# Patient Record
Sex: MALE | Race: OTHER | URBAN - METROPOLITAN AREA
[De-identification: names, ages, dates, MRNs, and addresses within clinical notes are randomized per-mention and may not be internally consistent; named-entity substitution may affect disease eponyms.]

---

## 2022-04-14 ENCOUNTER — PREPPED CHART (OUTPATIENT)
Dept: URBAN - METROPOLITAN AREA CLINIC 50 | Facility: CLINIC | Age: 77
End: 2022-04-14

## 2022-09-23 ENCOUNTER — NEW PATIENT (OUTPATIENT)
Dept: URBAN - METROPOLITAN AREA CLINIC 50 | Facility: CLINIC | Age: 77
End: 2022-09-23

## 2022-09-23 DIAGNOSIS — H40.1131: ICD-10-CM

## 2022-09-23 DIAGNOSIS — H35.3130: ICD-10-CM

## 2022-09-23 DIAGNOSIS — E11.9: ICD-10-CM

## 2022-09-23 DIAGNOSIS — H25.813: ICD-10-CM

## 2022-09-23 PROCEDURE — 76514 ECHO EXAM OF EYE THICKNESS: CPT

## 2022-09-23 PROCEDURE — 92015 DETERMINE REFRACTIVE STATE: CPT

## 2022-09-23 PROCEDURE — 92134 CPTRZ OPH DX IMG PST SGM RTA: CPT

## 2022-09-23 PROCEDURE — 92004 COMPRE OPH EXAM NEW PT 1/>: CPT

## 2022-09-23 RX ORDER — BRIMONIDINE TARTRATE, TIMOLOL MALEATE 2; 5 MG/ML; MG/ML: 1 SOLUTION/ DROPS OPHTHALMIC TWICE A DAY

## 2022-09-23 ASSESSMENT — VISUAL ACUITY
OU_SC: J16
OS_PH: 20/25
OD_CC: 20/40
OD_CC: J1
OD_SC: J16
OS_SC: J16
OS_SC: 20/25
OU_SC: 20/25
OS_CC: J2
OU_CC: J1
OS_CC: 20/30
OU_CC: 20/30
OD_SC: 20/25
OD_PH: 20/25

## 2022-09-23 ASSESSMENT — KERATOMETRY
OD_K1POWER_DIOPTERS: 42.25
OS_K1POWER_DIOPTERS: 41.75
OS_AXISANGLE_DEGREES: 003
OD_AXISANGLE_DEGREES: 175
OS_AXISANGLE2_DEGREES: 93
OS_K2POWER_DIOPTERS: 43.00
OD_AXISANGLE2_DEGREES: 85
OD_K2POWER_DIOPTERS: 42.50

## 2022-09-23 ASSESSMENT — PACHYMETRY
OS_CT_UM: 486
OD_CT_UM: 498

## 2022-09-23 ASSESSMENT — TONOMETRY
OD_IOP_MMHG: 19
OD_IOP_MMHG: 15
OS_IOP_MMHG: 20
OS_IOP_MMHG: 16

## 2022-09-23 NOTE — PATIENT DISCUSSION
Will have patient sign a medical release form so that Dr. Edith Briceño can see patients previous testing. Patient stated he hasn't done VF testing since December of last year. Will have patient schedule next available for testing.

## 2022-09-23 NOTE — PATIENT DISCUSSION
Patient stated he does not have kidney failure, although it was listed in the referral received from Dr. Stephan Del Rio. Patient states he only have kidney stones. As a result of that patient to D/C Azopt and start Combigan BID OU and continue Latanoprost QHS OU. Will follow up with patient in 1 month for an IOP check. Sample of Combigan given to patient.

## 2023-01-13 ENCOUNTER — FOLLOW UP (OUTPATIENT)
Dept: URBAN - METROPOLITAN AREA CLINIC 50 | Facility: CLINIC | Age: 78
End: 2023-01-13

## 2023-01-13 DIAGNOSIS — H40.1131: ICD-10-CM

## 2023-01-13 PROCEDURE — 92133 CPTRZD OPH DX IMG PST SGM ON: CPT

## 2023-01-13 PROCEDURE — 92012 INTRM OPH EXAM EST PATIENT: CPT

## 2023-01-13 PROCEDURE — 92083 EXTENDED VISUAL FIELD XM: CPT

## 2023-01-13 RX ORDER — BRIMONIDINE TARTRATE 2 MG/MG
1 SOLUTION/ DROPS OPHTHALMIC TWICE A DAY
Start: 2023-01-13

## 2023-01-13 ASSESSMENT — TONOMETRY
OD_IOP_MMHG: 11
OS_IOP_MMHG: 10
OS_IOP_MMHG: 14
OD_IOP_MMHG: 15

## 2023-01-13 ASSESSMENT — VISUAL ACUITY
OU_CC: J1+
OD_CC: 20/25
OU_CC: 20/25
OS_CC: 20/25

## 2023-01-13 ASSESSMENT — KERATOMETRY
OD_K2POWER_DIOPTERS: 42.50
OD_AXISANGLE2_DEGREES: 85
OS_K2POWER_DIOPTERS: 43.00
OD_K1POWER_DIOPTERS: 42.25
OS_K1POWER_DIOPTERS: 41.75
OS_AXISANGLE2_DEGREES: 93
OS_AXISANGLE_DEGREES: 003
OD_AXISANGLE_DEGREES: 175

## 2023-01-13 NOTE — PATIENT DISCUSSION
HVF/RNFL OCT testing was reviewed with patient. Decreased IOP OU with changes to drop therapy. Combigan is high in cost. Will discontinue Combigan and switch to a generic. Start Brimonidine and Timolol OU BID. Continue current Latanoprost. Follow up in 6 months for IOP check.

## 2023-06-16 ENCOUNTER — FOLLOW UP (OUTPATIENT)
Dept: URBAN - METROPOLITAN AREA CLINIC 50 | Facility: CLINIC | Age: 78
End: 2023-06-16

## 2023-06-16 DIAGNOSIS — H40.1131: ICD-10-CM

## 2023-06-16 PROCEDURE — 92012 INTRM OPH EXAM EST PATIENT: CPT

## 2023-06-16 ASSESSMENT — KERATOMETRY
OD_AXISANGLE2_DEGREES: 85
OS_K2POWER_DIOPTERS: 43.00
OS_AXISANGLE_DEGREES: 003
OD_AXISANGLE_DEGREES: 175
OD_K2POWER_DIOPTERS: 42.50
OS_K1POWER_DIOPTERS: 41.75
OS_AXISANGLE2_DEGREES: 93
OD_K1POWER_DIOPTERS: 42.25

## 2023-06-16 ASSESSMENT — TONOMETRY
OS_IOP_MMHG: 11
OD_IOP_MMHG: 15
OS_IOP_MMHG: 15
OD_IOP_MMHG: 11

## 2023-06-16 ASSESSMENT — VISUAL ACUITY
OS_CC: 20/30-1
OS_PH: 20/25
OD_PH: 20/25
OD_CC: 20/30-2

## 2023-12-15 ENCOUNTER — COMPREHENSIVE EXAM (OUTPATIENT)
Dept: URBAN - METROPOLITAN AREA CLINIC 50 | Facility: LOCATION | Age: 78
End: 2023-12-15

## 2023-12-15 DIAGNOSIS — E11.9: ICD-10-CM

## 2023-12-15 DIAGNOSIS — H40.1131: ICD-10-CM

## 2023-12-15 DIAGNOSIS — H25.813: ICD-10-CM

## 2023-12-15 DIAGNOSIS — H35.3132: ICD-10-CM

## 2023-12-15 PROCEDURE — 99213 OFFICE O/P EST LOW 20 MIN: CPT

## 2023-12-15 PROCEDURE — 92015 DETERMINE REFRACTIVE STATE: CPT

## 2023-12-15 PROCEDURE — 92134 CPTRZ OPH DX IMG PST SGM RTA: CPT

## 2023-12-15 ASSESSMENT — VISUAL ACUITY
OU_CC: J1 @ 18"
OD_CC: 20/30
OD_SC: 20/30
OS_SC: 20/50
OS_CC: 20/50
OS_GLARE: 20/60
OS_PH: 20/40
OS_GLARE: 20/40

## 2023-12-15 ASSESSMENT — TONOMETRY
OD_IOP_MMHG: 12
OS_IOP_MMHG: 12
OD_IOP_MMHG: 16
OS_IOP_MMHG: 16

## 2023-12-15 ASSESSMENT — KERATOMETRY
OD_AXISANGLE_DEGREES: 176
OD_K1POWER_DIOPTERS: 42.00
OS_AXISANGLE2_DEGREES: 86
OS_K1POWER_DIOPTERS: 42.25
OD_K2POWER_DIOPTERS: 43.00
OS_K2POWER_DIOPTERS: 44.00
OD_AXISANGLE2_DEGREES: 86
OS_AXISANGLE_DEGREES: 176

## 2023-12-18 ENCOUNTER — ESTABLISHED PATIENT (OUTPATIENT)
Dept: URBAN - METROPOLITAN AREA CLINIC 52 | Facility: CLINIC | Age: 78
End: 2023-12-18

## 2023-12-18 DIAGNOSIS — H40.1131: ICD-10-CM

## 2023-12-18 DIAGNOSIS — H35.3132: ICD-10-CM

## 2023-12-18 DIAGNOSIS — H25.813: ICD-10-CM

## 2023-12-18 DIAGNOSIS — E11.9: ICD-10-CM

## 2023-12-18 PROCEDURE — 99214 OFFICE O/P EST MOD 30 MIN: CPT

## 2023-12-18 ASSESSMENT — KERATOMETRY
OS_AXISANGLE2_DEGREES: 86
OD_AXISANGLE_DEGREES: 176
OS_K2POWER_DIOPTERS: 44.00
OS_K1POWER_DIOPTERS: 42.25
OD_AXISANGLE2_DEGREES: 86
OD_K2POWER_DIOPTERS: 43.00
OD_K1POWER_DIOPTERS: 42.00
OS_AXISANGLE_DEGREES: 176

## 2023-12-18 ASSESSMENT — VISUAL ACUITY
OD_CC: 20/25-1
OS_CC: 20/60

## 2023-12-18 ASSESSMENT — TONOMETRY
OD_IOP_MMHG: 18
OS_IOP_MMHG: 14
OD_IOP_MMHG: 14
OS_IOP_MMHG: 18

## 2024-06-21 ENCOUNTER — FOLLOW UP (OUTPATIENT)
Dept: URBAN - METROPOLITAN AREA CLINIC 50 | Facility: LOCATION | Age: 79
End: 2024-06-21

## 2024-06-21 DIAGNOSIS — H25.813: ICD-10-CM

## 2024-06-21 DIAGNOSIS — H40.1131: ICD-10-CM

## 2024-06-21 PROCEDURE — 92133 CPTRZD OPH DX IMG PST SGM ON: CPT

## 2024-06-21 PROCEDURE — 92083 EXTENDED VISUAL FIELD XM: CPT

## 2024-06-21 PROCEDURE — 99213 OFFICE O/P EST LOW 20 MIN: CPT

## 2024-06-21 ASSESSMENT — KERATOMETRY
OD_AXISANGLE_DEGREES: 176
OS_AXISANGLE_DEGREES: 176
OD_K2POWER_DIOPTERS: 43.00
OS_K1POWER_DIOPTERS: 42.25
OS_K2POWER_DIOPTERS: 44.00
OS_AXISANGLE2_DEGREES: 86
OD_K1POWER_DIOPTERS: 42.00
OD_AXISANGLE2_DEGREES: 86

## 2024-06-21 ASSESSMENT — TONOMETRY
OS_IOP_MMHG: 16
OD_IOP_MMHG: 16
OD_IOP_MMHG: 12
OS_IOP_MMHG: 12

## 2024-06-21 ASSESSMENT — VISUAL ACUITY
OS_PH: 20/30
OD_SC: 20/20
OU_SC: 20/20
OS_SC: 20/60

## 2024-12-20 ENCOUNTER — COMPREHENSIVE EXAM (OUTPATIENT)
Age: 79
End: 2024-12-20

## 2024-12-20 DIAGNOSIS — E11.9: ICD-10-CM

## 2024-12-20 DIAGNOSIS — H40.1131: ICD-10-CM

## 2024-12-20 DIAGNOSIS — H52.4: ICD-10-CM

## 2024-12-20 DIAGNOSIS — H25.813: ICD-10-CM

## 2024-12-20 DIAGNOSIS — H35.3132: ICD-10-CM

## 2024-12-20 DIAGNOSIS — H43.811: ICD-10-CM

## 2024-12-20 PROCEDURE — 92015 DETERMINE REFRACTIVE STATE: CPT

## 2024-12-20 PROCEDURE — 99214 OFFICE O/P EST MOD 30 MIN: CPT

## 2024-12-20 PROCEDURE — 92134 CPTRZ OPH DX IMG PST SGM RTA: CPT

## 2025-06-27 ENCOUNTER — FOLLOW UP (OUTPATIENT)
Age: 80
End: 2025-06-27

## 2025-06-27 DIAGNOSIS — H25.813: ICD-10-CM

## 2025-06-27 DIAGNOSIS — H40.1131: ICD-10-CM

## 2025-06-27 PROCEDURE — 99213 OFFICE O/P EST LOW 20 MIN: CPT

## 2025-06-27 PROCEDURE — 92133 CPTRZD OPH DX IMG PST SGM ON: CPT

## 2025-06-27 PROCEDURE — 92083 EXTENDED VISUAL FIELD XM: CPT
